# Patient Record
Sex: MALE | Race: WHITE | Employment: FULL TIME | URBAN - METROPOLITAN AREA
[De-identification: names, ages, dates, MRNs, and addresses within clinical notes are randomized per-mention and may not be internally consistent; named-entity substitution may affect disease eponyms.]

---

## 2024-03-18 ENCOUNTER — APPOINTMENT (OUTPATIENT)
Dept: GENERAL RADIOLOGY | Age: 44
End: 2024-03-18
Payer: COMMERCIAL

## 2024-03-18 ENCOUNTER — HOSPITAL ENCOUNTER (EMERGENCY)
Age: 44
Discharge: HOME | End: 2024-03-18
Attending: EMERGENCY MEDICINE
Payer: COMMERCIAL

## 2024-03-18 VITALS
DIASTOLIC BLOOD PRESSURE: 104 MMHG | WEIGHT: 170 LBS | BODY MASS INDEX: 25.18 KG/M2 | RESPIRATION RATE: 16 BRPM | HEART RATE: 88 BPM | TEMPERATURE: 98.7 F | OXYGEN SATURATION: 100 % | SYSTOLIC BLOOD PRESSURE: 159 MMHG | HEIGHT: 69 IN

## 2024-03-18 DIAGNOSIS — R07.9 CHEST PAIN, UNSPECIFIED TYPE: Primary | ICD-10-CM

## 2024-03-18 DIAGNOSIS — I10 HYPERTENSION, UNSPECIFIED TYPE: ICD-10-CM

## 2024-03-18 LAB
ALBUMIN SERPL-MCNC: 4.1 G/DL (ref 3.4–5)
ALBUMIN/GLOB SERPL: 1.5 {RATIO} (ref 1.1–2.2)
ALP SERPL-CCNC: 78 U/L (ref 40–129)
ALT SERPL-CCNC: 25 U/L (ref 10–40)
ANION GAP SERPL CALCULATED.3IONS-SCNC: 9 MMOL/L (ref 3–16)
AST SERPL-CCNC: 22 U/L (ref 15–37)
BASOPHILS # BLD: 0 K/UL (ref 0–0.2)
BASOPHILS NFR BLD: 0.4 %
BILIRUB SERPL-MCNC: 0.3 MG/DL (ref 0–1)
BUN SERPL-MCNC: 8 MG/DL (ref 7–20)
CALCIUM SERPL-MCNC: 8.8 MG/DL (ref 8.3–10.6)
CHLORIDE SERPL-SCNC: 101 MMOL/L (ref 99–110)
CO2 SERPL-SCNC: 27 MMOL/L (ref 21–32)
CREAT SERPL-MCNC: 0.9 MG/DL (ref 0.9–1.3)
DEPRECATED RDW RBC AUTO: 14.3 % (ref 12.4–15.4)
EOSINOPHIL # BLD: 0.8 K/UL (ref 0–0.6)
EOSINOPHIL NFR BLD: 10.6 %
GFR SERPLBLD CREATININE-BSD FMLA CKD-EPI: >60 ML/MIN/{1.73_M2}
GLUCOSE SERPL-MCNC: 92 MG/DL (ref 70–99)
HCT VFR BLD AUTO: 46.2 % (ref 40.5–52.5)
HGB BLD-MCNC: 16.2 G/DL (ref 13.5–17.5)
LYMPHOCYTES # BLD: 0.5 K/UL (ref 1–5.1)
LYMPHOCYTES NFR BLD: 6.9 %
MCH RBC QN AUTO: 32.3 PG (ref 26–34)
MCHC RBC AUTO-ENTMCNC: 35.1 G/DL (ref 31–36)
MCV RBC AUTO: 91.8 FL (ref 80–100)
MONOCYTES # BLD: 0.8 K/UL (ref 0–1.3)
MONOCYTES NFR BLD: 10.4 %
NEUTROPHILS # BLD: 5.2 K/UL (ref 1.7–7.7)
NEUTROPHILS NFR BLD: 71.7 %
PLATELET # BLD AUTO: 205 K/UL (ref 135–450)
PMV BLD AUTO: 7.6 FL (ref 5–10.5)
POTASSIUM SERPL-SCNC: 3.8 MMOL/L (ref 3.5–5.1)
PROT SERPL-MCNC: 6.8 G/DL (ref 6.4–8.2)
RBC # BLD AUTO: 5.03 M/UL (ref 4.2–5.9)
SODIUM SERPL-SCNC: 137 MMOL/L (ref 136–145)
TROPONIN, HIGH SENSITIVITY: <6 NG/L (ref 0–22)
TROPONIN, HIGH SENSITIVITY: <6 NG/L (ref 0–22)
WBC # BLD AUTO: 7.3 K/UL (ref 4–11)

## 2024-03-18 PROCEDURE — 84484 ASSAY OF TROPONIN QUANT: CPT

## 2024-03-18 PROCEDURE — 93005 ELECTROCARDIOGRAM TRACING: CPT | Performed by: EMERGENCY MEDICINE

## 2024-03-18 PROCEDURE — 85025 COMPLETE CBC W/AUTO DIFF WBC: CPT

## 2024-03-18 PROCEDURE — 71045 X-RAY EXAM CHEST 1 VIEW: CPT

## 2024-03-18 PROCEDURE — 80053 COMPREHEN METABOLIC PANEL: CPT

## 2024-03-18 PROCEDURE — 6370000000 HC RX 637 (ALT 250 FOR IP): Performed by: EMERGENCY MEDICINE

## 2024-03-18 PROCEDURE — 99285 EMERGENCY DEPT VISIT HI MDM: CPT

## 2024-03-18 RX ORDER — BUTALBITAL, ACETAMINOPHEN AND CAFFEINE 50; 325; 40 MG/1; MG/1; MG/1
1 TABLET ORAL ONCE
Status: COMPLETED | OUTPATIENT
Start: 2024-03-18 | End: 2024-03-18

## 2024-03-18 RX ORDER — AMLODIPINE BESYLATE 5 MG/1
5 TABLET ORAL ONCE
Status: COMPLETED | OUTPATIENT
Start: 2024-03-18 | End: 2024-03-18

## 2024-03-18 RX ADMIN — AMLODIPINE BESYLATE 5 MG: 5 TABLET ORAL at 13:29

## 2024-03-18 RX ADMIN — BUTALBITAL, ACETAMINOPHEN AND CAFFEINE 1 TABLET: 325; 50; 40 TABLET ORAL at 13:29

## 2024-03-18 ASSESSMENT — PAIN - FUNCTIONAL ASSESSMENT
PAIN_FUNCTIONAL_ASSESSMENT: 0-10
PAIN_FUNCTIONAL_ASSESSMENT: 0-10
PAIN_FUNCTIONAL_ASSESSMENT: NONE - DENIES PAIN

## 2024-03-18 ASSESSMENT — PAIN SCALES - GENERAL
PAINLEVEL_OUTOF10: 2
PAINLEVEL_OUTOF10: 3
PAINLEVEL_OUTOF10: 2

## 2024-03-18 ASSESSMENT — PAIN DESCRIPTION - LOCATION: LOCATION: CHEST;HEAD

## 2024-03-18 NOTE — DISCHARGE INSTRUCTIONS
Baby aspirin daily. No exertional activity until follow up with cardiology.  Close follow up with cardiology within 1-2 days.   If persistent or worsening symptoms, or if you have any concerns, return to ED immediately.

## 2024-03-18 NOTE — ED PROVIDER NOTES
was given amlodipine in the ER for the blood pressure.  Patient reports his headache is mild and improved after receiving aspirin at urgent care.  Given this, I have low concern for subarachnoid hemorrhage.  He has no nuchal rigidity.  Not on blood thinners.  He was given Fioricet for the headache.  Differential diagnosis for the chest pain includes ACS, arrhythmia, PE, among others.  He is not tachycardic, has no leg swelling, no calf tenderness, denies having history of DVT or PE, and therefore, I have low concern for PE at this time.  He is noted to have reproducible tenderness to palpation over the left chest wall, and the pain may be musculoskeletal in nature.  Creatinine is normal at 0.9.  Troponin is less than 6.  No leukocytosis.  Hemoglobin within normal limits at 16.2.  Repeat troponin again less than 6.  HEART score 3. MACE <2%. Although patient has risk factors for heart disease, the tenderness to the chest wall and two negative troponins reassuring and I think patient is appropriate for outpatient cardiology follow up.  Discussed the importance of following up with cardiology for further evaluation and treatment.  Discussed no exertional activity until follow-up with cardiology.  Patient verbalized understanding was agreeable with plan.  Discussed following up with PCP discuss his blood pressure.  Patient discharged home with strict return precautions.    CONSULTS: (Who and What was discussed)  None    Is this patient to be included in the SEP-1 Core Measure due to severe sepsis or septic shock?   No   Exclusion criteria - the patient is NOT to be included for SEP-1 Core Measure due to:  2+ SIRS criteria are not met       During the patient's ED course, the patient was given:  Medications   amLODIPine (NORVASC) tablet 5 mg (5 mg Oral Given 3/18/24 1329)   butalbital-acetaminophen-caffeine (FIORICET, ESGIC) per tablet 1 tablet (1 tablet Oral Given 3/18/24 1329)            I am the Primary Clinician of

## 2024-03-19 LAB
EKG ATRIAL RATE: 83 BPM
EKG DIAGNOSIS: NORMAL
EKG P-R INTERVAL: 160 MS
EKG Q-T INTERVAL: 356 MS
EKG QRS DURATION: 92 MS
EKG QTC CALCULATION (BAZETT): 418 MS
EKG R AXIS: 121 DEGREES
EKG T AXIS: 116 DEGREES
EKG VENTRICULAR RATE: 83 BPM

## 2024-03-19 PROCEDURE — 93010 ELECTROCARDIOGRAM REPORT: CPT | Performed by: INTERNAL MEDICINE

## 2024-03-29 NOTE — PROGRESS NOTES
CARDIOLOGY CONSULTATION        Patient Name: Marcelino Donahue Jr.  Primary Care physician: Pankaj Hernandez MD    Reason for Referral/Chief Complaint: Marcelino Donahue Jr. is a 43 y.o. patient who is referred to cardiology clinic today for evaluation and treatment of chest pain and HTN.     History of Present Illness:   Marcelino Donahue Jr. Is a 43 y.o. with no prior medical history.      Pt recently seen in ER on 3/18/24 for chest pain that radiates down L arm while working outside. EKG reads normal sinus rhythm (3/19/24), CXR- no acute process and troponin negative.  Advised to follow up with PCP and cardiology.     Today, 4/3/24, patient reports having chest pain for a few years, but his symptoms have now worsened over the last few weeks.   He describes left sided chest pain that is stabbing and occurs at least a couple times a week. This pain sometimes wakes pt up from sleep associated with SOB and diaphoresis with the last episode occurring one week ago.  Chest pain is associated with SOB and fatigue.  He denies palpitations and dizziness.  Patient has not been to doctor since 2012, so unable to provide medical history.  He recently went to urgent care and was sent to ER for chest pain evaluation.  Family history is significant for his mother who has history of heart disease and a maternal grandfather who passed with history of heart disease.  Patient cannot recall details of their cardiac condition.  Patient is a current smoker and has smoked since 1991. He smokes 1 pack per day, drinks 6-8 beers a day, and smokes marijuana daily.      Past Medical History:   has no past medical history on file.    Surgical History:   has no past surgical history on file.     Social History:   reports that he has been smoking cigarettes. He has a 20.0 pack-year smoking history. His smokeless tobacco use includes snuff. He reports current alcohol use. He reports current drug use. Drug: Marijuana (Weed).     Family

## 2024-04-03 ENCOUNTER — OFFICE VISIT (OUTPATIENT)
Dept: CARDIOLOGY CLINIC | Age: 44
End: 2024-04-03
Payer: COMMERCIAL

## 2024-04-03 ENCOUNTER — HOSPITAL ENCOUNTER (OUTPATIENT)
Age: 44
Discharge: HOME | End: 2024-04-03
Payer: COMMERCIAL

## 2024-04-03 VITALS
BODY MASS INDEX: 26 KG/M2 | WEIGHT: 175.5 LBS | OXYGEN SATURATION: 96 % | HEIGHT: 69 IN | SYSTOLIC BLOOD PRESSURE: 136 MMHG | HEART RATE: 90 BPM | DIASTOLIC BLOOD PRESSURE: 74 MMHG

## 2024-04-03 DIAGNOSIS — Z79.899 MEDICATION MANAGEMENT: ICD-10-CM

## 2024-04-03 DIAGNOSIS — R07.2 PRECORDIAL PAIN: Primary | ICD-10-CM

## 2024-04-03 DIAGNOSIS — Z72.0 TOBACCO USE: ICD-10-CM

## 2024-04-03 DIAGNOSIS — Z82.49 FAMILY HISTORY OF CORONARY ARTERY DISEASE: ICD-10-CM

## 2024-04-03 DIAGNOSIS — R06.02 SOB (SHORTNESS OF BREATH): ICD-10-CM

## 2024-04-03 PROBLEM — R07.9 CHEST PAIN: Status: ACTIVE | Noted: 2024-04-03

## 2024-04-03 LAB
CHOLEST SERPL-MCNC: 147 MG/DL (ref 0–199)
HDLC SERPL-MCNC: 49 MG/DL (ref 40–60)
LDLC SERPL CALC-MCNC: 86 MG/DL
TRIGL SERPL-MCNC: 60 MG/DL (ref 0–150)
VLDLC SERPL CALC-MCNC: 12 MG/DL

## 2024-04-03 PROCEDURE — 80061 LIPID PANEL: CPT

## 2024-04-03 PROCEDURE — 83036 HEMOGLOBIN GLYCOSYLATED A1C: CPT

## 2024-04-03 PROCEDURE — 36415 COLL VENOUS BLD VENIPUNCTURE: CPT

## 2024-04-03 PROCEDURE — 99204 OFFICE O/P NEW MOD 45 MIN: CPT | Performed by: INTERNAL MEDICINE

## 2024-04-03 RX ORDER — NITROGLYCERIN 0.4 MG/1
0.4 TABLET SUBLINGUAL EVERY 5 MIN PRN
Qty: 25 TABLET | Refills: 3 | Status: SHIPPED | OUTPATIENT
Start: 2024-04-03

## 2024-04-03 RX ORDER — METOPROLOL SUCCINATE 25 MG/1
25 TABLET, EXTENDED RELEASE ORAL DAILY
Qty: 90 TABLET | Refills: 3 | Status: SHIPPED | OUTPATIENT
Start: 2024-04-03

## 2024-04-03 NOTE — PATIENT INSTRUCTIONS
Plan:  Labs- Fasting lipids, A1c   Echo to assess heart function.   Stress test myoview to assess for blockages  Start 81mg aspirin daily, if having severe chest pain can take up to 4 a day.   Start metoprolol succinate (Toprol XL)  25mg daily.   Nitroglycerin as needed for chest pain.   Educated on smoking cessation. Recommend cutting back to 0.5 pack per day.   Follow up with me in 4-6 weeks    Your provider has ordered testing for further evaluation.  An order/prescription has been included in your paper work.   To schedule outpatient testing, contact Central Scheduling by calling [address] [place] [address] ([phone]).

## 2024-04-04 LAB
EST. AVERAGE GLUCOSE BLD GHB EST-MCNC: 99.7 MG/DL
HBA1C MFR BLD: 5.1 %

## 2024-04-05 ENCOUNTER — TELEPHONE (OUTPATIENT)
Dept: CARDIOLOGY CLINIC | Age: 44
End: 2024-04-05

## 2024-04-05 NOTE — TELEPHONE ENCOUNTER
----- Message from Ky Bautista MD sent at 4/4/2024  5:13 PM EDT -----  Please inform patient lipid panel and HbA1c is within normal range  ----- Message -----  From: [facility] Incoming Lab Results From Soft (Epic Adt)  Sent: 4/3/2024   6:28 PM EDT  To: Ky Bautista MD

## 2024-05-14 ENCOUNTER — TELEPHONE (OUTPATIENT)
Dept: CARDIOLOGY CLINIC | Age: 44
End: 2024-05-14

## 2024-05-14 ENCOUNTER — HOSPITAL ENCOUNTER (OUTPATIENT)
Dept: CARDIOLOGY | Age: 44
Discharge: HOME | End: 2024-05-16
Attending: INTERNAL MEDICINE
Payer: COMMERCIAL

## 2024-05-14 VITALS
WEIGHT: 175 LBS | DIASTOLIC BLOOD PRESSURE: 74 MMHG | BODY MASS INDEX: 25.92 KG/M2 | SYSTOLIC BLOOD PRESSURE: 136 MMHG | HEIGHT: 69 IN

## 2024-05-14 DIAGNOSIS — Z82.49 FAMILY HISTORY OF CORONARY ARTERY DISEASE: ICD-10-CM

## 2024-05-14 DIAGNOSIS — R06.02 SOB (SHORTNESS OF BREATH): ICD-10-CM

## 2024-05-14 DIAGNOSIS — R07.2 PRECORDIAL PAIN: ICD-10-CM

## 2024-05-14 LAB
ECHO AO ASC DIAM: 3.5 CM
ECHO AO ASCENDING AORTA INDEX: 1.79 CM/M2
ECHO AO ROOT DIAM: 3.3 CM
ECHO AO ROOT INDEX: 1.69 CM/M2
ECHO AV AREA PEAK VELOCITY: 2.7 CM2
ECHO AV AREA VTI: 2.3 CM2
ECHO AV AREA/BSA PEAK VELOCITY: 1.4 CM2/M2
ECHO AV AREA/BSA VTI: 1.2 CM2/M2
ECHO AV MEAN GRADIENT: 3 MMHG
ECHO AV MEAN VELOCITY: 0.8 M/S
ECHO AV PEAK GRADIENT: 5 MMHG
ECHO AV PEAK VELOCITY: 1.1 M/S
ECHO AV VELOCITY RATIO: 0.73
ECHO AV VTI: 22.1 CM
ECHO BSA: 1.97 M2
ECHO BSA: 1.97 M2
ECHO LA AREA 2C: 15.8 CM2
ECHO LA AREA 4C: 10.5 CM2
ECHO LA DIAMETER INDEX: 1.44 CM/M2
ECHO LA DIAMETER: 2.8 CM
ECHO LA MAJOR AXIS: 4.2 CM
ECHO LA MINOR AXIS: 5 CM
ECHO LA TO AORTIC ROOT RATIO: 0.85
ECHO LA VOL BP: 31 ML (ref 18–58)
ECHO LA VOL MOD A2C: 40 ML (ref 18–58)
ECHO LA VOL MOD A4C: 21 ML (ref 18–58)
ECHO LA VOL/BSA BIPLANE: 16 ML/M2 (ref 16–34)
ECHO LA VOLUME INDEX MOD A2C: 21 ML/M2 (ref 16–34)
ECHO LA VOLUME INDEX MOD A4C: 11 ML/M2 (ref 16–34)
ECHO LV E' LATERAL VELOCITY: 9 CM/S
ECHO LV E' SEPTAL VELOCITY: 9 CM/S
ECHO LV FRACTIONAL SHORTENING: 34 % (ref 28–44)
ECHO LV INTERNAL DIMENSION DIASTOLE INDEX: 1.79 CM/M2
ECHO LV INTERNAL DIMENSION DIASTOLIC: 3.5 CM (ref 4.2–5.9)
ECHO LV INTERNAL DIMENSION SYSTOLIC INDEX: 1.18 CM/M2
ECHO LV INTERNAL DIMENSION SYSTOLIC: 2.3 CM
ECHO LV IVSD: 1 CM (ref 0.6–1)
ECHO LV MASS 2D: 111 G (ref 88–224)
ECHO LV MASS INDEX 2D: 56.9 G/M2 (ref 49–115)
ECHO LV POSTERIOR WALL DIASTOLIC: 1.1 CM (ref 0.6–1)
ECHO LV RELATIVE WALL THICKNESS RATIO: 0.63
ECHO LVOT AREA: 3.8 CM2
ECHO LVOT AV VTI INDEX: 0.59
ECHO LVOT DIAM: 2.2 CM
ECHO LVOT MEAN GRADIENT: 2 MMHG
ECHO LVOT PEAK GRADIENT: 2 MMHG
ECHO LVOT PEAK VELOCITY: 0.8 M/S
ECHO LVOT STROKE VOLUME INDEX: 25.5 ML/M2
ECHO LVOT SV: 49.8 ML
ECHO LVOT VTI: 13.1 CM
ECHO MV A VELOCITY: 0.58 M/S
ECHO MV E DECELERATION TIME (DT): 198 MS
ECHO MV E VELOCITY: 0.42 M/S
ECHO MV E/A RATIO: 0.72
ECHO MV E/E' LATERAL: 4.67
ECHO MV E/E' RATIO (AVERAGED): 4.67
ECHO MV E/E' SEPTAL: 4.67
ECHO RV TAPSE: 1.9 CM (ref 1.7–?)
NUC STRESS EJECTION FRACTION: 61 %
NUC STRESS LV EDV: 93 ML (ref 67–155)
NUC STRESS LV ESV: 36 ML (ref 22–58)
NUC STRESS LV MASS: 137 G
STRESS BASELINE DIAS BP: 111 MMHG
STRESS BASELINE HR: 86 BPM
STRESS BASELINE SYS BP: 160 MMHG
STRESS ESTIMATED WORKLOAD: 1 METS
STRESS PEAK DIAS BP: 112 MMHG
STRESS PEAK SYS BP: 208 MMHG
STRESS PERCENT HR ACHIEVED: 75 %
STRESS POST PEAK HR: 130 BPM
STRESS RATE PRESSURE PRODUCT: NORMAL BPM*MMHG
STRESS TARGET HR: 174 BPM

## 2024-05-14 PROCEDURE — 78452 HT MUSCLE IMAGE SPECT MULT: CPT | Performed by: INTERNAL MEDICINE

## 2024-05-14 PROCEDURE — 93306 TTE W/DOPPLER COMPLETE: CPT

## 2024-05-14 PROCEDURE — 93017 CV STRESS TEST TRACING ONLY: CPT

## 2024-05-14 PROCEDURE — 3430000000 HC RX DIAGNOSTIC RADIOPHARMACEUTICAL: Performed by: INTERNAL MEDICINE

## 2024-05-14 PROCEDURE — 6360000002 HC RX W HCPCS: Performed by: INTERNAL MEDICINE

## 2024-05-14 PROCEDURE — 93016 CV STRESS TEST SUPVJ ONLY: CPT | Performed by: INTERNAL MEDICINE

## 2024-05-14 PROCEDURE — A9502 TC99M TETROFOSMIN: HCPCS | Performed by: INTERNAL MEDICINE

## 2024-05-14 PROCEDURE — 93018 CV STRESS TEST I&R ONLY: CPT | Performed by: INTERNAL MEDICINE

## 2024-05-14 RX ORDER — REGADENOSON 0.08 MG/ML
0.4 INJECTION, SOLUTION INTRAVENOUS
Status: COMPLETED | OUTPATIENT
Start: 2024-05-14 | End: 2024-05-14

## 2024-05-14 RX ADMIN — TETROFOSMIN 11.3 MILLICURIE: 1.38 INJECTION, POWDER, LYOPHILIZED, FOR SOLUTION INTRAVENOUS at 11:09

## 2024-05-14 RX ADMIN — REGADENOSON 0.4 MG: 0.08 INJECTION, SOLUTION INTRAVENOUS at 12:46

## 2024-05-14 RX ADMIN — TETROFOSMIN 33.3 MILLICURIE: 1.38 INJECTION, POWDER, LYOPHILIZED, FOR SOLUTION INTRAVENOUS at 12:46

## 2024-05-14 NOTE — TELEPHONE ENCOUNTER
Attempted to reach pt, left VM to return call to office    Please relay both results:    Please inform patient nuclear stress test showed normal LV function and no acute ischemia (signs of blockage).  Continue current regimen and keep FU as scheduled.     Please inform patient echo showed normal LV function, continue current regimen and keep FU as scheduled.

## 2024-05-14 NOTE — TELEPHONE ENCOUNTER
Carlos, a [facility] auth rep called and stated insurance denied myoview stress test. Carlos is requesting a peer to peer to be done with pt's insurance for approval. Myoview stress test has been completed. Please advise.     Reference number: [id number]

## 2024-05-14 NOTE — TELEPHONE ENCOUNTER
Please inform patient nuclear stress test showed normal LV function and no acute ischemia (signs of blockage).  Continue current regimen and keep FU as scheduled.

## 2024-05-14 NOTE — TELEPHONE ENCOUNTER
----- Message from Ky Bautista MD sent at 5/14/2024  2:51 PM EDT -----  Please inform patient echo showed normal LV function, continue current regimen and keep FU as scheduled.    ----- Message -----  From: Jesus Benavides MD  Sent: 5/14/2024  12:34 PM EDT  To: Ky Bautista MD       no

## 2024-05-21 ENCOUNTER — OFFICE VISIT (OUTPATIENT)
Dept: CARDIOLOGY CLINIC | Age: 44
End: 2024-05-21
Payer: COMMERCIAL

## 2024-05-21 VITALS
OXYGEN SATURATION: 96 % | WEIGHT: 168 LBS | HEIGHT: 69 IN | HEART RATE: 91 BPM | BODY MASS INDEX: 24.88 KG/M2 | SYSTOLIC BLOOD PRESSURE: 164 MMHG | DIASTOLIC BLOOD PRESSURE: 110 MMHG

## 2024-05-21 DIAGNOSIS — I10 UNCONTROLLED HYPERTENSION: Primary | ICD-10-CM

## 2024-05-21 DIAGNOSIS — F10.11 HISTORY OF ETOH ABUSE: ICD-10-CM

## 2024-05-21 DIAGNOSIS — R07.2 PRECORDIAL PAIN: ICD-10-CM

## 2024-05-21 DIAGNOSIS — Z82.49 FAMILY HISTORY OF CORONARY ARTERY DISEASE: ICD-10-CM

## 2024-05-21 DIAGNOSIS — Z72.0 TOBACCO USE: ICD-10-CM

## 2024-05-21 PROCEDURE — 3080F DIAST BP >= 90 MM HG: CPT | Performed by: INTERNAL MEDICINE

## 2024-05-21 PROCEDURE — 3077F SYST BP >= 140 MM HG: CPT | Performed by: INTERNAL MEDICINE

## 2024-05-21 PROCEDURE — 99214 OFFICE O/P EST MOD 30 MIN: CPT | Performed by: INTERNAL MEDICINE

## 2024-05-21 RX ORDER — ASPIRIN 81 MG/1
81 TABLET, CHEWABLE ORAL DAILY
COMMUNITY

## 2024-05-21 RX ORDER — VALSARTAN 160 MG/1
160 TABLET ORAL 2 TIMES DAILY
Qty: 60 TABLET | Refills: 3 | Status: SHIPPED | OUTPATIENT
Start: 2024-05-21

## 2024-05-21 RX ORDER — METOPROLOL SUCCINATE 25 MG/1
25 TABLET, EXTENDED RELEASE ORAL 2 TIMES DAILY
Qty: 90 TABLET | Refills: 3 | Status: SHIPPED | OUTPATIENT
Start: 2024-05-21

## 2024-05-21 NOTE — TELEPHONE ENCOUNTER
Attempted to call Beto with [facility] to notify that test had already been completed and to defer to billing with no answer. LVM to return call.

## 2024-05-21 NOTE — PROGRESS NOTES
than normal. Mildly increased wall thickness. Findings consistent with mild concentric hypertrophy. Normal wall motion. Grade I diastolic dysfunction with normal LAP.     Stress Test 5/14/24  Stress Combined Conclusion: Normal pharmacological myocardial perfusion study.   Stress Function: Left ventricular function post-stress is normal. Post-stress ejection fraction is 61%. Stress end diastolic volume: 93 mL. Stress end systolic volume: 36 mL. LV mass: 137.0 g.  Perfusion Comments: LV perfusion is normal.   Stress Test: A pharmacological stress test was performed using regadenoson (Lexiscan). The patient reported no symptoms during the stress test.    Cardiac catheterization:    Additional studies:     Impression and Plan:      1.  Uncontrolled HTN  2.  Tobacco use  3.  H/o EtOH use  4.  Fam HX of CAD    PLAN  Start Valsartan 160 mg twice per day.   Start metoprolol succinate (Toprol XL) 25 mg twice per day.   Recommend low sodium diet to help better control your blood pressure.   Follow up in 2 weeks for a heart rate and blood pressure check.   Recommend smoking cessation: [id number]   Follow up with me in 6 months.     This note was scribed in the presence of Ky Bautista MD, [facility] F[facility] by Dawn Amaya RN.      The scribe’s documentation has been prepared under my direction and personally reviewed by me in its entirety.  I confirm that the note above accurately reflects all work, treatment, procedures, and medical decision making performed by me.  I, Ky Bautista MD, personally performed the services described in this documentation as scribed by Dawn Amaya RN in my presence, and it is both accurate and complete to the best of our ability.     I will address the patient's cardiac risk factors and adjusted pharmacologic treatment as needed. In addition, I have reinforced the need for patient directed risk factor modification.  All questions and concerns were addressed to the patient/family. Alternatives

## 2024-05-21 NOTE — PATIENT INSTRUCTIONS
PLAN  Start Valsartan 160 mg twice per day.   Start metoprolol succinate (Toprol XL) 25 mg twice per day.   Recommend low sodium diet to help better control your blood pressure.   Follow up in 2 weeks for a heart rate and blood pressure check.   Recommend smoking cessation: [id number]   Follow up with me in 6 months.   
100

## 2024-06-04 ENCOUNTER — TELEPHONE (OUTPATIENT)
Dept: CARDIOLOGY CLINIC | Age: 44
End: 2024-06-04

## 2024-06-04 ENCOUNTER — NURSE ONLY (OUTPATIENT)
Dept: CARDIOLOGY CLINIC | Age: 44
End: 2024-06-04

## 2024-06-04 VITALS — DIASTOLIC BLOOD PRESSURE: 102 MMHG | SYSTOLIC BLOOD PRESSURE: 150 MMHG | HEART RATE: 80 BPM

## 2024-06-04 DIAGNOSIS — Z79.899 MEDICATION MANAGEMENT: Primary | ICD-10-CM

## 2024-06-04 DIAGNOSIS — I10 UNCONTROLLED HYPERTENSION: ICD-10-CM

## 2024-06-04 NOTE — TELEPHONE ENCOUNTER
Add HCTZ 25mg daily to help lower BP. Good med in combo with ARB valsartan. Recheck BMP 1 week after starting due to possible kidney dehydration and low potassium. Goal /85 or less. Low salt diet. If home BP readings still not at goal after 2 weeks then call back.

## 2024-06-04 NOTE — TELEPHONE ENCOUNTER
Patient presented today for blood pressure and heart rate check. He started Metoprolol and Valsartan two weeks ago. He is compliant with medication and is not having side effects. First blood pressure was 150/98 and HR was 85. I waited 5 minutes and repeated the blood pressure, it was 150/102 and the HR was 80. Please advise.

## 2024-06-05 RX ORDER — HYDROCHLOROTHIAZIDE 25 MG/1
25 TABLET ORAL EVERY MORNING
Qty: 90 TABLET | Refills: 0 | Status: SHIPPED | OUTPATIENT
Start: 2024-06-05